# Patient Record
Sex: FEMALE
[De-identification: names, ages, dates, MRNs, and addresses within clinical notes are randomized per-mention and may not be internally consistent; named-entity substitution may affect disease eponyms.]

---

## 2019-01-04 ENCOUNTER — HOSPITAL ENCOUNTER (EMERGENCY)
Dept: HOSPITAL 14 - H.ER | Age: 39
Discharge: HOME | End: 2019-01-04
Payer: SELF-PAY

## 2019-01-04 VITALS — OXYGEN SATURATION: 99 % | RESPIRATION RATE: 18 BRPM

## 2019-01-04 VITALS — HEART RATE: 89 BPM | TEMPERATURE: 98 F | SYSTOLIC BLOOD PRESSURE: 114 MMHG | DIASTOLIC BLOOD PRESSURE: 77 MMHG

## 2019-01-04 VITALS — BODY MASS INDEX: 24.1 KG/M2

## 2019-01-04 DIAGNOSIS — Z3A.09: ICD-10-CM

## 2019-01-04 DIAGNOSIS — O20.0: Primary | ICD-10-CM

## 2019-01-04 DIAGNOSIS — O34.11: ICD-10-CM

## 2019-01-04 DIAGNOSIS — D25.2: ICD-10-CM

## 2019-01-04 LAB
BASOPHILS # BLD AUTO: 0.1 K/UL (ref 0–0.2)
BASOPHILS NFR BLD: 0.5 % (ref 0–2)
BUN SERPL-MCNC: 12 MG/DL (ref 7–17)
CALCIUM SERPL-MCNC: 9.7 MG/DL (ref 8.4–10.2)
EOSINOPHIL # BLD AUTO: 0.1 K/UL (ref 0–0.7)
EOSINOPHIL NFR BLD: 0.9 % (ref 0–4)
ERYTHROCYTE [DISTWIDTH] IN BLOOD BY AUTOMATED COUNT: 14 % (ref 11.5–14.5)
GFR NON-AFRICAN AMERICAN: > 60
HGB BLD-MCNC: 12.4 G/DL (ref 12–16)
LYMPHOCYTES # BLD AUTO: 2.4 K/UL (ref 1–4.3)
LYMPHOCYTES NFR BLD AUTO: 23.7 % (ref 20–40)
MCH RBC QN AUTO: 28.2 PG (ref 27–31)
MCHC RBC AUTO-ENTMCNC: 33.4 G/DL (ref 33–37)
MCV RBC AUTO: 84.3 FL (ref 81–99)
MONOCYTES # BLD: 0.6 K/UL (ref 0–0.8)
MONOCYTES NFR BLD: 5.9 % (ref 0–10)
NEUTROPHILS # BLD: 7 K/UL (ref 1.8–7)
NEUTROPHILS NFR BLD AUTO: 69 % (ref 50–75)
NRBC BLD AUTO-RTO: 0 % (ref 0–0)
PLATELET # BLD: 270 K/UL (ref 130–400)
PMV BLD AUTO: 8.2 FL (ref 7.2–11.7)
RBC # BLD AUTO: 4.41 MIL/UL (ref 3.8–5.2)
WBC # BLD AUTO: 10.1 K/UL (ref 4.8–10.8)

## 2019-01-04 NOTE — US
Date of service: 



01/04/2019



PROCEDURE:  OB Pelvic Ultrasound



HISTORY:

lower abd pain vag bleeding



LMP: 10/25/2018 suggesting gestation of 10 weeks 1 day.



COMPARISON:

Transvaginal pelvic ultrasound 12/05/2018.



FINDINGS:



TECHNIQUE:

Transvaginal ultrasonography of pregnancy was performed in multiple 

projections and includes Doppler technique as well.



UTERUS:

Gestational sac: Single intrauterine gestation.



Fetal Heart rate: 173 bpm.



Fetal age (Ultrasound estimated): 9 weeks 5 days based on gestational 

sac and fetal pole mean measurements of 4.35 and 2.85 respectively.



Sveta-gestational hemorrhage: There is questionable trace subchorionic 

hemorrhage anteriorly with no large hemorrhage evident nevertheless.



Date of delivery (Ultrasound estimated) : 08/04/2019.



Uterus measures 11.1 x 8.9 x 7.2 cm.  Multiple uterine fibroids 

identified.  A sub serosal fibroid is seen at the mid right fundus 

toward the right measuring 3.0 x 2.5 x 2.7 cm.  A 2nd fibroid is 

identified sub serosal as well in the anterior mid fundus measuring 

2.2 x 1.7 x 2.1 cm toward the left.  Finally, a smaller partially 

calcified fibroid identified at the lower uterine segment sub serosal 

in location measuring 0.7 x 0.5 x 0.8 cm.. 



CERVIX:

Measures 4.6 cm. Long and closed. No cervical abnormality seen.



RIGHT OVARY:

Measures 3.4 x 3.7 x 2.2 cm. No mass lesion. Normal flow. Corpus 

luteum cyst measures 2.3 cm greatest dimension.



LEFT OVARY:

Measures 4.0 x 2.4 x 1.9 cm. No solid mass. Normal flow. 



FREE FLUID:

None.



OTHER FINDINGS:

None. 



IMPRESSION:

A single viable intrauterine gestation is identified with average 

ultrasonic age of 9 weeks 5 days indicating normal interval growth, 

not previously seen on prior transvaginal pelvic ultrasound 

12/05/2018.  This is concordant with menstrual dates as well.  Trace 

anterior subchorionic hemorrhage is suggested.  Clinical correlation 

and potential follow-up ultrasound are advised.



Four subserosal fibroids are identified at the uterine fundus as 

discussed above.

## 2019-01-04 NOTE — ED PDOC
HPI: Abdomen


Time Seen by Provider: 19 09:25


Chief Complaint (Nursing): Female Genitourinary


Chief Complaint (Provider): Lower abdominal pain and vaginal bleeding


History Per: Patient


History/Exam Limitations: no limitations


Onset/Duration Of Symptoms: Days (vaginal bleeding x3; abdominal pain x1)


Current Symptoms Are (Timing): Still Present


Associated Symptoms: denies: Fever, Vomiting, Urinary Symptoms


Additional Complaint(s): 





38 year old female, who is , presents to the ED with lower abdominal pain 

and vaginal bleeding.  Vaginal bleeding began on Wednesday and abdominal pain 

began today.  She reports bleeding was originally with blood clots but became 

dark discharge.  She states she has lower abdominal pain and suprapubic pain 

which has been constant since this morning.  Denies any urinary problems, fever,

or vomiting.  She reports she is 10 weeks pregnant.  Patient did have an 

ultrasound for this pregnancy in December which, patient thinks, did not show 

IUP.  Patient has not begun prenatal care





PMD: none





Past Medical History


Reviewed: Historical Data, Nursing Documentation, Vital Signs


Vital Signs: 





                                Last Vital Signs











Temp  99.1 F   19 09:14


 


Pulse  80   19 09:14


 


Resp  18   19 09:14


 


BP  113/74   19 09:14


 


Pulse Ox  99   19 09:14














- Medical History


PMH: No Chronic Diseases





- Surgical History


Surgical History: Cholecystectomy





- Family History


Family History: States: Unknown Family Hx





- Allergies


Allergies/Adverse Reactions: 


                                    Allergies











Allergy/AdvReac Type Severity Reaction Status Date / Time


 


No Known Allergies Allergy   Verified 19 09:35














Review of Systems


ROS Statement: Except As Marked, All Systems Reviewed And Found Negative


Constitutional: Negative for: Fever


Gastrointestinal: Positive for: Abdominal Pain.  Negative for: Vomiting


Genitourinary Female: Positive for: Vaginal Bleeding.  Negative for: Dysuria, 

Hematuria





Physical Exam





- Reviewed


Nursing Documentation Reviewed: Yes


Vital Signs Reviewed: Yes





- Physical Exam


Appears: Positive for: Non-toxic, No Acute Distress


Head Exam: Positive for: ATRAUMATIC, NORMOCEPHALIC


Skin: Positive for: Normal Color, Warm, Dry


Eye Exam: Positive for: Normal appearance


Neck: Positive for: Normal, Painless ROM


Cardiovascular/Chest: Positive for: Regular Rate, Rhythm


Respiratory: Positive for: Normal Breath Sounds.  Negative for: Wheezing, 

Respiratory Distress


Gastrointestinal/Abdominal: Positive for: Tenderness (Suprapubic and LLQ te

nderness)


Extremity: Positive for: Normal ROM


Neurologic/Psych: Positive for: Alert, Oriented.  Negative for: Motor/Sensory 

Deficits





- Laboratory Results


Result Diagrams: 


                                 19 10:44





                                 19 10:44





- ECG


O2 Sat by Pulse Oximetry: 99 (RA)


Pulse Ox Interpretation: Normal





- Progress


Re-evaluation Time: 12:59


Condition: Re-examined, Improved





Medical Decision Making


Medical Decision Making: 





Initial Impression: Abdominal pain and vaginal bleeding in pregnant female.  


Differential includes threatened miscarriage, spontaneous miscarriage, fetal 

demise, UTI, and ovarian cysts





Initial Plan: 


--ABO/RH type stat


--Type and screen stat


--BMP


--Pregnancy serum stat


--ED urine pregnancy


--ED urine dipstick


--CBC


--OB transvaginal pregnancy US





11:47


Transvaginal US


FINDINGS:





TECHNIQUE:


Transvaginal ultrasonography of pregnancy was performed in multiple projections 

and includes Doppler technique as well.





UTERUS:


Gestational sac: Single intrauterine gestation.





Fetal Heart rate: 173 bpm.





Fetal age (Ultrasound estimated): 9 weeks 5 days based on gestational sac and 

fetal pole mean measurements of 4.35 and 2.85 respectively.





Sveta-gestational hemorrhage: There is questionable trace subchorionic hemorrhage

anteriorly with no large hemorrhage evident nevertheless.





Date of delivery (Ultrasound estimated) : 2019.





Uterus measures 11.1 x 8.9 x 7.2 cm.  Multiple uterine fibroids identified.  A 

sub serosal fibroid is seen at the mid right fundus toward the right measuring 

3.0 x 2.5 x 2.7 cm.  A 2nd fibroid is identified sub serosal as well in the 

anterior mid fundus measuring 2.2 x 1.7 x 2.1 cm toward the left.  Finally, a 

smaller partially calcified fibroid identified at the lower uterine segment sub 

serosal in location measuring 0.7 x 0.5 x 0.8 cm.. 





CERVIX:


Measures 4.6 cm. Long and closed. No cervical abnormality seen.





RIGHT OVARY:


Measures 3.4 x 3.7 x 2.2 cm. No mass lesion. Normal flow. Corpus luteum cyst 

measures 2.3 cm greatest dimension.





LEFT OVARY:


Measures 4.0 x 2.4 x 1.9 cm. No solid mass. Normal flow. 





FREE FLUID:


None.





OTHER FINDINGS:


None. 





IMPRESSION:


A single viable intrauterine gestation is identified with average ultrasonic age

of 9 weeks 5 days indicating normal interval growth, not previously seen on 

prior transvaginal pelvic ultrasound 2018.  This is concordant with 

menstrual dates as well.  Trace anterior subchorionic hemorrhage is suggested.  

Clinical correlation and potential follow-up ultrasound are advised.





Four subserosal fibroids are identified at the uterine fundus as discussed 

above.





-------------------------------------------------

------------------------------------------------


Scribe Attestation:


Documented by Arcadio Nieves acting as a scribe for Jennifer Steven MD.





Provider Scribe Attestation:


All medical record entries made by the Scribe were at my direction and 

personally dictated by me. I have reviewed the chart and agree that the record 

accurately reflects my personal performance of the history, physical exam, med

ical decision making, and the department course for this patient. I have also 

personally directed, reviewed, and agree with the discharge instructions and 

disposition.





Disposition





- Clinical Impression


Clinical Impression: 


 Threatened , Subchorionic hemorrhage in first trimester, Uterine 

fibroid








- Patient ED Disposition


Is Patient to be Admitted: No


Doctor Will See Patient In The: Office


Counseled Patient/Family Regarding: Studies Performed, Diagnosis





- Disposition


Referrals: 


[facility] [Outside]


Disposition: Routine/Home


Disposition Time: 13:01


Condition: GOOD


Additional Instructions: 





JARVIS GONZALES, thank you for letting us take care of you today. Your

 provider was Jennifer Steven MD and you were treated for 10 WEEKS,FEMALE 

GENITOURINARY. The emergency medical care you received today was directed at 

your acute symptoms. If you were prescribed any medication, please fill it and 

take as directed. It may take several days for your symptoms to resolve. Return 

to the Emergency Department if your symptoms worsen, do not improve, or if you 

have any other problems.





Please contact your doctor or call one of the physicians/clinics you have been 

referred to that are listed on the Patient Visit Information form that is 

included in your discharge packet. Bring any paperwork you were given at 

discharge with you along with any medications you are taking to your follow up 

visit. Our treatment cannot replace ongoing medical care by a primary care vik orozco outside of the emergency department.





Thank you for allowing the [employer] team to be part of your care today.








If you had an X-Ray or CT scan: A Radiologist will review the ED reading if any 

change in treatment is needed we will contact you.***





If you had a blood, urine, or wound culture: It will take several days for the 

results, if any change in treatment is needed we will contact you.***





If you had an STI test: It will take 48 hours for the results. Please call after

 1 week if you have not heard back.***


Instructions:  Uterine Fibroids, Threatened Miscarriage


Print Language: Icelandic

## 2019-01-16 ENCOUNTER — HOSPITAL ENCOUNTER (EMERGENCY)
Dept: HOSPITAL 14 - H.ER | Age: 39
Discharge: HOME | End: 2019-01-16
Payer: SELF-PAY

## 2019-01-16 VITALS
HEART RATE: 68 BPM | RESPIRATION RATE: 17 BRPM | SYSTOLIC BLOOD PRESSURE: 120 MMHG | DIASTOLIC BLOOD PRESSURE: 72 MMHG | TEMPERATURE: 98.3 F

## 2019-01-16 VITALS — BODY MASS INDEX: 24.1 KG/M2

## 2019-01-16 DIAGNOSIS — Z3A.11: ICD-10-CM

## 2019-01-16 DIAGNOSIS — O20.0: Primary | ICD-10-CM

## 2019-01-16 DIAGNOSIS — Z87.891: ICD-10-CM

## 2019-01-16 LAB
ALBUMIN SERPL-MCNC: 3.8 G/DL (ref 3.5–5)
ALBUMIN/GLOB SERPL: 1.1 {RATIO} (ref 1–2.1)
ALT SERPL-CCNC: 30 U/L (ref 9–52)
AST SERPL-CCNC: 18 U/L (ref 14–36)
BASOPHILS # BLD AUTO: 0.1 K/UL (ref 0–0.2)
BASOPHILS NFR BLD: 0.7 % (ref 0–2)
BILIRUB UR-MCNC: NEGATIVE MG/DL
BUN SERPL-MCNC: 8 MG/DL (ref 7–17)
CALCIUM SERPL-MCNC: 9.2 MG/DL (ref 8.4–10.2)
COLOR UR: COLORLESS
EOSINOPHIL # BLD AUTO: 0.1 K/UL (ref 0–0.7)
EOSINOPHIL NFR BLD: 1 % (ref 0–4)
ERYTHROCYTE [DISTWIDTH] IN BLOOD BY AUTOMATED COUNT: 14 % (ref 11.5–14.5)
GFR NON-AFRICAN AMERICAN: > 60
GLUCOSE UR STRIP-MCNC: 150 MG/DL
HGB BLD-MCNC: 12.4 G/DL (ref 12–16)
LEUKOCYTE ESTERASE UR-ACNC: (no result) LEU/UL
LYMPHOCYTES # BLD AUTO: 2.6 K/UL (ref 1–4.3)
LYMPHOCYTES NFR BLD AUTO: 26.7 % (ref 20–40)
MCH RBC QN AUTO: 28.7 PG (ref 27–31)
MCHC RBC AUTO-ENTMCNC: 32.8 G/DL (ref 33–37)
MCV RBC AUTO: 87.4 FL (ref 81–99)
MONOCYTES # BLD: 0.6 K/UL (ref 0–0.8)
MONOCYTES NFR BLD: 6.6 % (ref 0–10)
NEUTROPHILS # BLD: 6.4 K/UL (ref 1.8–7)
NEUTROPHILS NFR BLD AUTO: 65 % (ref 50–75)
NRBC BLD AUTO-RTO: 0.1 % (ref 0–0)
PH UR STRIP: 7 [PH] (ref 5–8)
PLATELET # BLD: 297 K/UL (ref 130–400)
PMV BLD AUTO: 8.4 FL (ref 7.2–11.7)
PROT UR STRIP-MCNC: NEGATIVE MG/DL
RBC # BLD AUTO: 4.32 MIL/UL (ref 3.8–5.2)
RBC # UR STRIP: (no result) /UL
SP GR UR STRIP: < 1.005 (ref 1–1.03)
URINE CLARITY: CLEAR
UROBILINOGEN UR-MCNC: (no result) MG/DL (ref 0.2–1)
WBC # BLD AUTO: 9.8 K/UL (ref 4.8–10.8)

## 2019-01-16 PROCEDURE — 99285 EMERGENCY DEPT VISIT HI MDM: CPT

## 2019-01-16 PROCEDURE — 76817 TRANSVAGINAL US OBSTETRIC: CPT

## 2019-01-16 PROCEDURE — 81003 URINALYSIS AUTO W/O SCOPE: CPT

## 2019-01-16 PROCEDURE — 80053 COMPREHEN METABOLIC PANEL: CPT

## 2019-01-16 PROCEDURE — 85025 COMPLETE CBC W/AUTO DIFF WBC: CPT

## 2019-01-16 PROCEDURE — 82948 REAGENT STRIP/BLOOD GLUCOSE: CPT

## 2019-01-16 NOTE — ED PDOC
HPI: Abdomen


Time Seen by Provider: 19 11:30


Chief Complaint (Nursing): Abdominal Pain


Chief Complaint (Provider): Abdominal Pain


History Per: Patient


Location Of Pain/Discomfort: Suprapubic


Associated Symptoms: Urinary Symptoms (burning sensation while urinating).  

denies: Fever, Chills, Nausea, Vomiting


Additional Complaint(s): 





Corine Jones is a  38 year old female with a past medical history 

of , who presents to the emergency department complaining of vaginal 

bleeding that consists of clots, associated with lower abdominal pain. Patient 

has been to the ED x2 previous times for vaginal bleeding during pregnancy and 

has been discharged each time after evaluation. She is more concerned today 

because of the type of blood and the quantity of clots. Patient further states 

that she has had a burning sensation while she urinates. She further states that

she is not on any medications and that her prenatal care doctor is the [facility]. Patient denies any fever, chills, nausea or vomiting. 





PMD: [facility] 





Past Medical History


Reviewed: Historical Data, Nursing Documentation, Vital Signs


Vital Signs: 





                                Last Vital Signs











Temp  98.6 F   19 10:46


 


Pulse  74   19 10:46


 


Resp  18   19 10:46


 


BP  123/77   19 10:46


 


Pulse Ox  98   19 11:02














- Medical History


PMH: No Chronic Diseases


   Denies: Chronic Kidney Disease





- Surgical History


Surgical History: Cholecystectomy





- Family History


Family History: States: Unknown Family Hx





- Social History


Current smoker - smoking cessation education provided: No


Ex-Smoker (has not smoked in the last 12 months): No


Alcohol: None





- Allergies


Allergies/Adverse Reactions: 


                                    Allergies











Allergy/AdvReac Type Severity Reaction Status Date / Time


 


No Known Allergies Allergy   Verified 19 09:35














Review of Systems


ROS Statement: Except As Marked, All Systems Reviewed And Found Negative


Constitutional: Negative for: Fever, Chills


Gastrointestinal: Positive for: Abdominal Pain.  Negative for: Nausea, Vomiting


Genitourinary Female: Positive for: Vaginal Bleeding, Other (burning sensation 

while urinating)





Physical Exam





- Reviewed


Nursing Documentation Reviewed: Yes


Vital Signs Reviewed: Yes





- Physical Exam


Appears: Positive for: Non-toxic, No Acute Distress


Head Exam: Positive for: ATRAUMATIC, NORMOCEPHALIC


Skin: Positive for: Normal Color, Warm, Dry


Eye Exam: Positive for: Normal appearance, EOMI, PERRL


ENT: Positive for: Normal ENT Inspection


Neck: Positive for: Normal, Painless ROM, Supple


Cardiovascular/Chest: Positive for: Regular Rate, Rhythm.  Negative for: Murmur


Respiratory: Positive for: Normal Breath Sounds.  Negative for: Respiratory 

Distress


Gastrointestinal/Abdominal: Positive for: Tenderness (mild suprapubic 

discomfort)


Back: Negative for: L CVA Tenderness, R CVA Tenderness


Extremity: Positive for: Normal ROM.  Negative for: Pedal Edema, Deformity


Neurologic/Psych: Positive for: Alert, Oriented.  Negative for: Motor/Sensory 

Deficits





- ECG


O2 Sat by Pulse Oximetry: 98 (RA)


Pulse Ox Interpretation: Normal





Medical Decision Making


Medical Decision Making: 





Time: 1149


Impression: Vaginal bleeding


--Rule out termination pregnancy vs. spontaneous miscarriage


Plan: 





--CMP


--CBC with differential


--Glucose, blood POC


--Urinalysis 


--OB transvaginal pregnancy US 


--IV insertion


--Sodium chloride 1,000 ml 








-----------------------------------------------------------------------

--------------------------   


Scribe Attestation:


Documented by Lambert Martino, acting as a scribe for Stephanie Mora MD. 





Provider Scribe Attestation:


All medical record entries made by the Scribe were at my direction and 

personally dictated by me. I have reviewed the chart and agree that the record 

accurately reflects my personal performance of the history, physical exam, 

medical decision making, and the department course for this patient. I have also

personally directed, reviewed, and agree with the discharge instructions and 

disposition.











Disposition





- Disposition


Condition: FAIR

## 2019-01-16 NOTE — US
Date of service: 



01/16/2019



HISTORY:

pregnant with vaginal bleeding, clots..  LMP 10/25/2018. 



COMPARISON:

None available.



TECHNIQUE:

1/4/2019 transvaginal



FINDINGS:



UTERUS:

Measures 12.6 x 9.6 x 8.8 cm. A right fundal fibroid is suggested 

measuring 2.8 x 2.7 x 2.4 cm-this is unchanged..  Is also is slightly 

n anterior right uterine upper body/bordering fundal fibroid in 

location.  The prior anterior subserosal fibroid with calcification 

currently measures 1.7 x 1.4 x 0.9 cm.  Overall size is are similar 

to that mention previously. 



An intrauterine gestation with cardiac activity is present.  Fetal 

heartbeat is 159 beats per minute. 



Gestational sac, yolk sac fetal pole all present.  These fetal 

biometric parameters correspond to an 11 week 2 to 5 day gestation.  

There is suggestion of normal physiologic chorio amniotic separation 

noted on this exam as well. 



CERVIX:

Closed at 4.3 cm in length



RIGHT OVARY:

Measures 4.1 x 2.1 x 1.8 cm.  Corpus luteal focus suggested 1.9 x 1.5 

x 1.1 cm.  No worrisome appearing mass on the right seen.  Normal 

flow seen. 



LEFT OVARY:

Left ovary not visualized. 



FREE FLUID:

No significant free fluid noted.



OTHER FINDINGS:

None. 



IMPRESSION:

Single intrauterine gestation with normal cardiac activity whose 

fetal biometrics correspond to an ultrasound age of 11 weeks 4 days 

+/-0 weeks 6 days.  Estimated date of delivery is 8/3/2019 by 

ultrasound.  These dates are concordant with the gestational age by 

LMP of 11 weeks 6 days.



Uterine fibroids as above.  Similar measurements.  Close unremarkable 

appearing cervix.  No gestational hemorrhage seen.Other findings as 

above.

## 2019-01-16 NOTE — ED PDOC
- Laboratory Results


Result Diagrams: 


                                 19 12:09





                                 19 12:09


Lab Results: 





                                        











Total Bilirubin  0.3 mg/dl (0.2-1.3)   19  12:09    


 


AST  18 U/L (14-36)   19  12:09    


 


ALT  30 U/L (9-52)   19  12:09    


 


Alkaline Phosphatase  53 U/L ()   19  12:09    


 


Total Protein  7.3 G/DL (6.3-8.2)   19  12:09    


 


Albumin  3.8 g/dL (3.5-5.0)   19  12:09    


 


Globulin  3.5 gm/dL (2.2-3.9)   19  12:09    


 


Albumin/Globulin Ratio  1.1  (1.0-2.1)   19  12:09    














- ECG


O2 Sat by Pulse Oximetry: 98 (RA)





Medical Decision Making


Medical Decision Making: 





Accession No. : [id number]


Patient Name / ID : ROZ CONLEY  / [id number]


Exam Date : 2019 12:10:08 ( Approved )


Study Comment : 


Sex / Age : F  / 38Y





Creator : Caldwell-Lombardi, Kathleen


Dictator : Caldwell-Lombardi, Kathleen


 : 


Approver : Caldwell-Lombardi, Kathleen


Approver2 : 





Report Date : 2019 13:32:59


My Comment : 


***********************************************************

************************





Date of service: 





2019





HISTORY:


pregnant with vaginal bleeding, clots..  LMP 10/25/2018. 





COMPARISON:


None available.





TECHNIQUE:


2019 transvaginal





FINDINGS:





UTERUS:


Measures 12.6 x 9.6 x 8.8 cm. A right fundal fibroid is suggested measuring 2.8 

x 2.7 x 2.4 cm-this is unchanged..  Is also is slightly n anterior right uterine

upper body/bordering fundal fibroid in location.  The prior anterior subserosal 

fibroid with calcification currently measures 1.7 x 1.4 x 0.9 cm.  Overall size 

is are similar to that mention previously. 





An intrauterine gestation with cardiac activity is present.  Fetal heartbeat is 

159 beats per minute. 





Gestational sac, yolk sac fetal pole all present.  These fetal biometric 

parameters correspond to an 11 week 2 to 5 day gestation.  There is suggestion 

of normal physiologic chorio amniotic separation noted on this exam as well. 





CERVIX:


Closed at 4.3 cm in length





RIGHT OVARY:


Measures 4.1 x 2.1 x 1.8 cm.  Corpus luteal focus suggested 1.9 x 1.5 x 1.1 cm. 

No worrisome appearing mass on the right seen.  Normal flow seen. 





LEFT OVARY:


Left ovary not visualized. 





FREE FLUID:


No significant free fluid noted.





OTHER FINDINGS:


None. 





IMPRESSION:


Single intrauterine gestation with normal cardiac activity whose fetal 

biometrics correspond to an ultrasound age of 11 weeks 4 days +/-0 weeks 6 days.

 Estimated date of delivery is 8/3/2019 by ultrasound.  These dates are 

concordant with the gestational age by LMP of 11 weeks 6 days.





Uterine fibroids as above.  Similar measurements.  Close unremarkable appearing 

cervix.  No gestational hemorrhage seen.Other findings as above.





Disposition





- Clinical Impression


Clinical Impression: 


 Threatened 








- POA


Present On Arrival: None





- Disposition


Disposition: Routine/Home


Disposition Time: 19:08


Condition: STABLE


Additional Instructions: 


FOLLOW-UP WITH OB-GYN WITHIN 2 DAYS FOR REEVALUATION. 


Instructions:  Threatened Miscarriage


Forms:  to be (Khmer)


Print Language: Guinean





Addendum


Addendum: 





19 16:00





Pt signed out by Dr. Mora pending ultrasound.

## 2019-01-23 VITALS — OXYGEN SATURATION: 98 %

## 2019-02-09 ENCOUNTER — HOSPITAL ENCOUNTER (EMERGENCY)
Dept: HOSPITAL 14 - H.ER | Age: 39
Discharge: HOME | End: 2019-02-09
Payer: SELF-PAY

## 2019-02-09 VITALS
RESPIRATION RATE: 18 BRPM | DIASTOLIC BLOOD PRESSURE: 65 MMHG | OXYGEN SATURATION: 98 % | SYSTOLIC BLOOD PRESSURE: 118 MMHG | HEART RATE: 86 BPM | TEMPERATURE: 98.8 F

## 2019-02-09 VITALS — BODY MASS INDEX: 24.1 KG/M2

## 2019-02-09 DIAGNOSIS — H66.90: Primary | ICD-10-CM

## 2019-02-09 NOTE — ED PDOC
HPI: CCC, URI, Sore Throat


Time Seen by Provider: 02/09/19 02:14


Chief Complaint (Nursing): Flu-like Symptoms


History Per: Patient


Additional Complaint(s): 





Cough and congestion without fever x 1 week which has improved. Reports this 

evening she developed L sided earache and noticed blood in the L ear. Of note, 

pt. is pregnant. Denies fever, chest pain, SOB, hemoptysis, abd pain, vaginal 

bleeding. 





Past Medical History


Reviewed: Historical Data, Nursing Documentation, Vital Signs


Vital Signs: 





                                Last Vital Signs











Temp  98.8 F   02/09/19 03:30


 


Pulse  86   02/09/19 03:30


 


Resp  18   02/09/19 03:30


 


BP  118/65   02/09/19 03:30


 


Pulse Ox  98   02/09/19 03:30














- Medical History


PMH: 


   Denies: Chronic Kidney Disease





- Surgical History


Surgical History: Cholecystectomy





- Family History


Family History: States: No Known Family Hx





- Home Medications


Home Medications: 


                                Ambulatory Orders











 Medication  Instructions  Recorded


 


Neomycin/Polymyxin/Hydrocortis 4 drop AU TID #1 bottle 02/09/19





[Cortisporin Otic Susp]  


 


RX: Amoxicillin 875 mg PO BID #19 tablet 02/09/19














- Allergies


Allergies/Adverse Reactions: 


                                    Allergies











Allergy/AdvReac Type Severity Reaction Status Date / Time


 


No Known Allergies Allergy   Verified 01/04/19 09:35














Review of Systems


ROS Statement: Except As Marked, All Systems Reviewed And Found Negative


ENT: Positive for: Ear Pain, Nose Congestion


Respiratory: Positive for: Cough





Physical Exam





- Physical Exam


Appears: Positive for: Well, Non-toxic, No Acute Distress


Skin: Positive for: Normal Color, Warm.  Negative for: Rash


Eye Exam: Positive for: Normal appearance, EOMI, PERRL


ENT: Positive for: TM Is/Are (L TM is erythematous and bulging, R TM WNL), Other

(L ear canal with mild edema and exudate but TM is still visualized; R ear canal

WNL; no mastoid tenderness or swelling).  Negative for: Sinus Pain/Drainage, 

Pharyngeal Erythema, Tonsillar Exudate, Tonsillar Swelling


Neck: Positive for: Normal, Painless ROM, Supple


Cardiovascular/Chest: Positive for: Regular Rate, Rhythm.  Negative for: 

Tachycardia


Respiratory: Positive for: Normal Breath Sounds.  Negative for: Rales, Wheezing,

Respiratory Distress


Gastrointestinal/Abdominal: Positive for: Soft (gravid).  Negative for: 

Tenderness


Neurologic/Psych: Positive for: Alert, Oriented (x3)





- ECG


O2 Sat by Pulse Oximetry: 98





- Progress


ED Course And Treament: 





Case d/w Dr. Weldon who recommends Cortisporin suspension ear drops for otitis

externa. 


Amoxicillin PO, tylenol PO ordered. 





Disposition





- Clinical Impression


Clinical Impression: 


 Otitis media, Otitis externa








- Patient ED Disposition


Is Patient to be Admitted: No





- Disposition


Referrals: 


[facility] [Outside]


Disposition: Routine/Home


Disposition Time: 03:00


Condition: STABLE


Additional Instructions: 


FOLLOW UP WITH UP WITH PMD FOR FURTHER EVALUATION


RETURN TO ED IMMEDIATELY IF SYMPTOMS WORSEN





JAVRIS GONZALES, thank you for letting us take care of you today. Your

 provider was Chiara Boswell MD and you were treated for LT EAR 

PAIN,COUGH. The emergency medical care you received today was directed at your 

acute symptoms. If you were prescribed any medication, please fill it and take 

as directed. It may take several days for your symptoms to resolve. Return to 

the Emergency Department if your symptoms worsen, do not improve, or if you have

 any other problems.





Please contact your doctor or call one of the physicians/clinics you have been 

referred to that are listed on the Patient Visit Information form that is 

included in your discharge packet. Bring any paperwork you were given at 

discharge with you along with any medications you are taking to your follow up 

visit. Our treatment cannot replace ongoing medical care by a primary care 

provider outside of the emergency department.





Thank you for allowing the [employer] team to be part of your care today.








If you had an X-Ray or CT scan: A Radiologist will review the ED reading if any 

change in treatment is needed we will contact you.***





If you had a blood, urine, or wound culture: It will take several days for the 

results, if any change in treatment is needed we will contact you.***





If you had an STI test: It will take 48 hours for the results. Please call after

 1 week if you have not heard back.***


Prescriptions: 


RX: Amoxicillin 875 mg PO BID #19 tablet


Neomycin/Polymyxin/Hydrocortis [Cortisporin Otic Susp] 4 drop AU TID #1 bottle


Instructions:  Ear Infections (Otitis Media) (DC), Outer Ear Infection (DC)


Forms:  [employer] (English)


Print Language: ENGLISH

## 2019-03-01 ENCOUNTER — HOSPITAL ENCOUNTER (OUTPATIENT)
Dept: HOSPITAL 31 - C.CARD | Age: 39
End: 2019-03-01
Payer: SELF-PAY